# Patient Record
Sex: MALE | Race: BLACK OR AFRICAN AMERICAN | NOT HISPANIC OR LATINO | Employment: PART TIME | URBAN - METROPOLITAN AREA
[De-identification: names, ages, dates, MRNs, and addresses within clinical notes are randomized per-mention and may not be internally consistent; named-entity substitution may affect disease eponyms.]

---

## 2018-11-28 ENCOUNTER — OCCUPATIONAL HEALTH (OUTPATIENT)
Dept: URGENT CARE | Facility: CLINIC | Age: 23
End: 2018-11-28

## 2018-11-28 DIAGNOSIS — Z02.1 DRUG SCREENING, PRE-EMPLOYMENT: Primary | ICD-10-CM

## 2018-11-28 LAB
CTP QC/QA: YES
POC 10 PANEL DRUG SCREEN: NEGATIVE

## 2018-11-28 PROCEDURE — 80305 DRUG TEST PRSMV DIR OPT OBS: CPT | Mod: QW,S$GLB,, | Performed by: NURSE PRACTITIONER

## 2018-11-28 NOTE — PROGRESS NOTES
Subjective:       Patient ID: Godwin Gomez is a 23 y.o. male.    Vitals:  vitals were not taken for this visit.     Chief Complaint: Drug / Alcohol Assessment    HPI  <[email or web address]>    Objective:      Physical Exam    Assessment:       No diagnosis found.    Plan:         There are no diagnoses linked to this encounter.